# Patient Record
Sex: MALE | Race: WHITE | NOT HISPANIC OR LATINO | URBAN - NONMETROPOLITAN AREA
[De-identification: names, ages, dates, MRNs, and addresses within clinical notes are randomized per-mention and may not be internally consistent; named-entity substitution may affect disease eponyms.]

---

## 2020-03-19 ENCOUNTER — IMPORTED ENCOUNTER (OUTPATIENT)
Dept: URBAN - NONMETROPOLITAN AREA CLINIC 1 | Facility: CLINIC | Age: 25
End: 2020-03-19

## 2020-03-19 PROBLEM — H52.223: Noted: 2020-03-19

## 2020-03-19 PROBLEM — H52.13: Noted: 2020-03-19

## 2020-03-19 PROBLEM — H18.613: Noted: 2020-03-19

## 2020-03-19 PROCEDURE — 92015 DETERMINE REFRACTIVE STATE: CPT

## 2020-03-19 PROCEDURE — 92310 CONTACT LENS FITTING OU: CPT

## 2020-03-19 PROCEDURE — 92004 COMPRE OPH EXAM NEW PT 1/>: CPT

## 2020-03-19 NOTE — PATIENT DISCUSSION
Astigmatism-Discussed diagnosis with patient. Myopia-Discussed diagnosis with patient. -Explained that people who are myopic are at a higher risk for developing RD/RT and reviewed associated S&S.-Pt to contact our office if symptoms develop. Updated spec Rx given. Recommend lens that will provide comfort as well as protect safety and health of eyes. order trials then pt to rtc for refittingkeratoconus oustable 2nd to cross-linkingmonitor

## 2022-04-10 ASSESSMENT — TONOMETRY
OS_IOP_MMHG: 10
OD_IOP_MMHG: 10

## 2022-04-10 ASSESSMENT — VISUAL ACUITY
OS_PH: 20/20
OS_SC: 20/50
OD_PH: 20/40-2
OD_SC: 20/60